# Patient Record
Sex: FEMALE | Race: WHITE | NOT HISPANIC OR LATINO | Employment: OTHER | URBAN - NONMETROPOLITAN AREA
[De-identification: names, ages, dates, MRNs, and addresses within clinical notes are randomized per-mention and may not be internally consistent; named-entity substitution may affect disease eponyms.]

---

## 2024-10-05 ENCOUNTER — OFFICE VISIT (OUTPATIENT)
Dept: URGENT CARE | Facility: CLINIC | Age: 79
End: 2024-10-05
Payer: MEDICARE

## 2024-10-05 VITALS
OXYGEN SATURATION: 96 % | DIASTOLIC BLOOD PRESSURE: 82 MMHG | WEIGHT: 199 LBS | HEIGHT: 62 IN | BODY MASS INDEX: 36.62 KG/M2 | SYSTOLIC BLOOD PRESSURE: 114 MMHG | TEMPERATURE: 97.9 F | HEART RATE: 60 BPM | RESPIRATION RATE: 16 BRPM

## 2024-10-05 DIAGNOSIS — B34.9 VIRAL ILLNESS: ICD-10-CM

## 2024-10-05 DIAGNOSIS — R55 PRE-SYNCOPE: Primary | ICD-10-CM

## 2024-10-05 LAB
GLUCOSE SERPL-MCNC: 155 MG/DL (ref 65–140)
SARS-COV-2 AG UPPER RESP QL IA: NEGATIVE
VALID CONTROL: NORMAL

## 2024-10-05 PROCEDURE — 99213 OFFICE O/P EST LOW 20 MIN: CPT

## 2024-10-05 PROCEDURE — 82948 REAGENT STRIP/BLOOD GLUCOSE: CPT

## 2024-10-05 PROCEDURE — G0463 HOSPITAL OUTPT CLINIC VISIT: HCPCS

## 2024-10-05 PROCEDURE — 93005 ELECTROCARDIOGRAM TRACING: CPT

## 2024-10-05 PROCEDURE — 87811 SARS-COV-2 COVID19 W/OPTIC: CPT

## 2024-10-05 RX ORDER — SITAGLIPTIN 50 MG/1
50 TABLET, FILM COATED ORAL DAILY
COMMUNITY
Start: 2024-04-30

## 2024-10-05 RX ORDER — ALLOPURINOL 300 MG/1
300 TABLET ORAL DAILY
COMMUNITY
Start: 2024-04-30

## 2024-10-05 RX ORDER — LEVOTHYROXINE SODIUM 50 UG/1
50 TABLET ORAL DAILY
COMMUNITY
Start: 2024-04-30

## 2024-10-05 RX ORDER — NITROGLYCERIN 0.4 MG/1
1 TABLET SUBLINGUAL
COMMUNITY

## 2024-10-05 RX ORDER — ASPIRIN 81 MG/1
81 TABLET ORAL DAILY
COMMUNITY

## 2024-10-05 RX ORDER — METOPROLOL TARTRATE 100 MG
100 TABLET ORAL 2 TIMES DAILY
COMMUNITY
Start: 2024-04-30

## 2024-10-05 RX ORDER — ROSUVASTATIN CALCIUM 20 MG/1
TABLET, COATED ORAL
COMMUNITY
Start: 2024-09-30

## 2024-10-05 RX ORDER — PAROXETINE 10 MG/1
1 TABLET, FILM COATED ORAL EVERY MORNING
COMMUNITY
Start: 2024-04-30

## 2024-10-05 NOTE — PROGRESS NOTES
[facility]        NAME: Lizett Howell is a 79 y.o. female  : 1945    MRN: [id number]  DATE: 2024  TIME: 11:39 AM    Assessment and Plan   Pre-syncope [R55]  1. Pre-syncope  Fingerstick Glucose (POCT)    ECG 12 lead      2. Viral illness  Poct Covid 19 Rapid Antigen Test        Discussed problem with patient and her friend.  EKG revealed sinus bradycardia fingerstick glucose revealed 155.  Unclear etiology of presyncopal symptoms.  Advised to push fluids and increase salt intake.  Could be orthostatic in nature and advised to follow-up with PCP and cardiology for this issue.  Discussed tricked red flag symptoms and to report to the ER if experiencing    Patient Instructions       Follow up with PCP in 3-5 days.  Proceed to  ER if symptoms worsen.    If tests are performed, our office will contact you with results only if changes need to made to the care plan discussed with you at the visit. You can review your full results on [facility].    Chief Complaint     Chief Complaint   Patient presents with   • Cold Like Symptoms     Started 1 day ago  Coughing, sore throat, and fatigue  No OTC medication         History of Present Illness       79-year-old female with a past medical history of type 2 diabetes, stage IIIa chronic kidney disease, hypertension presents with fatigue, sore throat complaints.  Also reported episode of presyncope yesterday.  States she went after dinner with her friend yesterday when she felt the gradual onset of lightheadedness and tunnel vision while at dinner.  States she did not pass out or hit her head and afterward felt slightly nauseous.  She then went to her friend's house and took her blood sugar which was 133.  Also reported in the last 2 days she started with gradual nasal congestion, postnasal drip, sore throat complaints which she states are minor.  Denies any fevers but does report mild chills.  Eating and drinking normally.  Does report  that she does get some orthostatic hypotension when she stands up too quickly and tries to avoid that by standing up gradually.  States symptoms felt similar.  Denies any chest pain, shortness of breath, pleuritic chest pain, heart palpitations      Review of Systems   Review of Systems   Constitutional:  Positive for chills and fatigue. Negative for fever.   HENT:  Positive for sore throat. Negative for ear pain, sinus pressure and sinus pain.    Respiratory:  Negative for shortness of breath, wheezing and stridor.    Cardiovascular:  Negative for chest pain and palpitations.   Musculoskeletal:  Negative for myalgias.   Neurological:  Positive for light-headedness. Negative for dizziness, syncope and headaches.         Current Medications       Current Outpatient Medications:   •  allopurinol (ZYLOPRIM) 300 mg tablet, Take 300 mg by mouth daily, Disp: , Rfl:   •  aspirin (ECOTRIN LOW STRENGTH) 81 mg EC tablet, Take 81 mg by mouth daily, Disp: , Rfl:   •  Januvia 50 MG tablet, Take 50 mg by mouth daily, Disp: , Rfl:   •  levothyroxine 50 mcg tablet, Take 50 mcg by mouth daily, Disp: , Rfl:   •  metoprolol tartrate (LOPRESSOR) 100 mg tablet, Take 100 mg by mouth 2 (two) times a day, Disp: , Rfl:   •  nitroglycerin (NITROSTAT) 0.4 mg SL tablet, Place 1 tablet under the tongue every 5 (five) minutes as needed, Disp: , Rfl:   •  PARoxetine (PAXIL) 10 mg tablet, Take 1 tablet by mouth every morning, Disp: , Rfl:   •  rosuvastatin (CRESTOR) 20 MG tablet, , Disp: , Rfl:     Current Allergies     Allergies as of 10/05/2024 - Reviewed 10/05/2024   Allergen Reaction Noted   • Ace inhibitors Cough 03/15/2022   • Angiotensin receptor blockers Cough 03/15/2022   • Other Other (See Comments) 04/26/2018   • Sympathomimetics Other (See Comments) 03/14/2001   • Celecoxib Rash 04/26/2018   • Codeine Rash 04/26/2018   • Ezetimibe Rash 04/26/2018   • Guaifenesin Rash 04/26/2018   • Simvastatin Rash 04/26/2018            The following  "portions of the patient's history were reviewed and updated as appropriate: allergies, current medications, past family history, past medical history, past social history, past surgical history and problem list.     Past Medical History:   Diagnosis Date   • Diabetes mellitus (HCC)    • Hyperlipidemia    • Hypertension        Past Surgical History:   Procedure Laterality Date   • CYST REMOVAL         No family history on file.      Medications have been verified.        Objective   /82   Pulse 60   Temp 97.9 °F (36.6 °C)   Resp 16   Ht 5' 2\" (1.575 m)   Wt 90.3 kg (199 lb)   SpO2 96%   BMI 36.40 kg/m²        Physical Exam     Physical Exam  Constitutional:       General: She is not in acute distress.     Appearance: Normal appearance. She is normal weight. She is not ill-appearing, toxic-appearing or diaphoretic.   HENT:      Head: Normocephalic.      Nose: Congestion and rhinorrhea present.      Mouth/Throat:      Mouth: Mucous membranes are moist.      Pharynx: Oropharynx is clear. No oropharyngeal exudate or posterior oropharyngeal erythema.   Eyes:      General: No scleral icterus.        Right eye: No discharge.         Left eye: No discharge.      Extraocular Movements: Extraocular movements intact.      Conjunctiva/sclera: Conjunctivae normal.      Pupils: Pupils are equal, round, and reactive to light.   Neck:      Vascular: No carotid bruit.   Cardiovascular:      Rate and Rhythm: Normal rate and regular rhythm.      Pulses: Normal pulses.      Heart sounds: Normal heart sounds. No murmur heard.     No friction rub. No gallop.   Pulmonary:      Effort: Pulmonary effort is normal. No respiratory distress.      Breath sounds: Normal breath sounds. No stridor. No wheezing, rhonchi or rales.   Chest:      Chest wall: No tenderness.   Musculoskeletal:      Cervical back: Normal range of motion and neck supple. No rigidity or tenderness.   Lymphadenopathy:      Cervical: No cervical adenopathy. "   Neurological:      Mental Status: She is alert.                  Yes

## 2024-10-07 LAB
ATRIAL RATE: 52 BPM
P AXIS: 68 DEGREES
PR INTERVAL: 186 MS
QRS AXIS: 28 DEGREES
QRSD INTERVAL: 86 MS
QT INTERVAL: 464 MS
QTC INTERVAL: 431 MS
T WAVE AXIS: 62 DEGREES
VENTRICULAR RATE: 52 BPM

## 2024-10-07 PROCEDURE — 93010 ELECTROCARDIOGRAM REPORT: CPT | Performed by: INTERNAL MEDICINE
